# Patient Record
Sex: FEMALE | Race: WHITE | URBAN - METROPOLITAN AREA
[De-identification: names, ages, dates, MRNs, and addresses within clinical notes are randomized per-mention and may not be internally consistent; named-entity substitution may affect disease eponyms.]

---

## 2022-01-27 ENCOUNTER — OFFICE VISIT (OUTPATIENT)
Dept: URBAN - METROPOLITAN AREA CLINIC 76 | Facility: CLINIC | Age: 27
End: 2022-01-27
Payer: COMMERCIAL

## 2022-01-27 DIAGNOSIS — H02.889 MGD OF EYE: ICD-10-CM

## 2022-01-27 DIAGNOSIS — H52.13 MYOPIA, BILATERAL: Primary | ICD-10-CM

## 2022-01-27 PROCEDURE — 92310 CONTACT LENS FITTING OU: CPT | Performed by: OPTOMETRIST

## 2022-01-27 PROCEDURE — 92004 COMPRE OPH EXAM NEW PT 1/>: CPT | Performed by: OPTOMETRIST

## 2022-01-27 ASSESSMENT — INTRAOCULAR PRESSURE
OD: 18
OS: 17

## 2022-01-27 ASSESSMENT — VISUAL ACUITY
OS: 20/20
OD: 20/20

## 2022-01-27 ASSESSMENT — KERATOMETRY
OD: 42.13
OS: 41.88

## 2022-01-27 NOTE — IMPRESSION/PLAN
Impression: Myopia, bilateral: H52.13. Bilateral. Plan: Discussed diagnosis with patient. Dispensed new MRx today. Order ctls trials. If pt happy with comfort and vision w/ ctls, ok to finalize. Pt to call with any concerns. Pt is interested in lasik sx. Recommend consult for lasik sx. Pt agrees with the plan.

## 2023-02-15 ENCOUNTER — OFFICE VISIT (OUTPATIENT)
Dept: URBAN - METROPOLITAN AREA CLINIC 76 | Facility: CLINIC | Age: 28
End: 2023-02-15

## 2023-02-15 DIAGNOSIS — H10.45 OTHER CHRONIC ALLERGIC CONJUNCTIVITIS: Primary | ICD-10-CM

## 2023-02-15 DIAGNOSIS — H00.014 HORDEOLUM EXTERNUM LEFT UPPER EYELID: ICD-10-CM

## 2023-02-15 PROCEDURE — 99212 OFFICE O/P EST SF 10 MIN: CPT | Performed by: OPTOMETRIST

## 2023-02-15 RX ORDER — DOXYCYCLINE HYCLATE 100 MG/1
100 MG TABLET, COATED ORAL
Qty: 20 | Refills: 0 | Status: ACTIVE
Start: 2023-02-15

## 2023-02-15 ASSESSMENT — INTRAOCULAR PRESSURE
OD: 14
OS: 14

## 2023-02-15 ASSESSMENT — KERATOMETRY
OD: 42.13
OS: 41.75

## 2023-02-15 NOTE — IMPRESSION/PLAN
Impression: Other chronic allergic conjunctivitis: H10.45 Bilateral. Plan: Discussed diagnosis with patient. Recommend OTC oral antihistamine. Start Ketotifen BID OU or Olopatadine QD OU. Rub excess into lids. Recommend refrigerating drops.

## 2023-02-15 NOTE — IMPRESSION/PLAN
Impression: Hordeolum externum left upper eyelid: H00.014. Left. Plan: Discussed diagnosis in detail with patient. Start focal heat with a hard boiled egg or tea bag TID for at least 15 minutes a day. Start Azithromycin 250mg tab once a day for 7 days. Recommend taking the antibiotics 15 minutes before doing the focal heat. Advised patient that the hordeolum may come to a head and drain, recommend using a warm wash cloth to clean any of the discharge. Start Doxycycline 100mg BID PO. Focal heat.